# Patient Record
Sex: FEMALE | Race: OTHER | NOT HISPANIC OR LATINO
[De-identification: names, ages, dates, MRNs, and addresses within clinical notes are randomized per-mention and may not be internally consistent; named-entity substitution may affect disease eponyms.]

---

## 2019-06-24 ENCOUNTER — RESULT REVIEW (OUTPATIENT)
Age: 34
End: 2019-06-24

## 2019-08-19 ENCOUNTER — EMERGENCY (EMERGENCY)
Facility: HOSPITAL | Age: 34
LOS: 1 days | Discharge: HOME | End: 2019-08-19
Attending: EMERGENCY MEDICINE
Payer: COMMERCIAL

## 2019-08-19 VITALS
WEIGHT: 138.01 LBS | HEIGHT: 68 IN | RESPIRATION RATE: 18 BRPM | TEMPERATURE: 98 F | DIASTOLIC BLOOD PRESSURE: 85 MMHG | SYSTOLIC BLOOD PRESSURE: 141 MMHG | HEART RATE: 69 BPM | OXYGEN SATURATION: 98 %

## 2019-08-19 LAB
ALBUMIN SERPL ELPH-MCNC: 4.2 G/DL — SIGNIFICANT CHANGE UP (ref 3.3–5)
ALP SERPL-CCNC: 42 U/L — SIGNIFICANT CHANGE UP (ref 40–120)
ALT FLD-CCNC: 10 U/L — SIGNIFICANT CHANGE UP (ref 10–45)
ANION GAP SERPL CALC-SCNC: 10 MMOL/L — SIGNIFICANT CHANGE UP (ref 5–17)
APTT BLD: 26.9 SEC — LOW (ref 27.5–36.3)
AST SERPL-CCNC: 13 U/L — SIGNIFICANT CHANGE UP (ref 10–40)
BASOPHILS # BLD AUTO: 0 K/UL — SIGNIFICANT CHANGE UP (ref 0–0.2)
BASOPHILS NFR BLD AUTO: 0.3 % — SIGNIFICANT CHANGE UP (ref 0–2)
BILIRUB SERPL-MCNC: 0.2 MG/DL — SIGNIFICANT CHANGE UP (ref 0.2–1.2)
BUN SERPL-MCNC: 6 MG/DL — LOW (ref 7–23)
CALCIUM SERPL-MCNC: 9.5 MG/DL — SIGNIFICANT CHANGE UP (ref 8.4–10.5)
CHLORIDE SERPL-SCNC: 101 MMOL/L — SIGNIFICANT CHANGE UP (ref 96–108)
CO2 SERPL-SCNC: 24 MMOL/L — SIGNIFICANT CHANGE UP (ref 22–31)
CREAT SERPL-MCNC: 0.63 MG/DL — SIGNIFICANT CHANGE UP (ref 0.5–1.3)
EOSINOPHIL # BLD AUTO: 0.2 K/UL — SIGNIFICANT CHANGE UP (ref 0–0.5)
EOSINOPHIL NFR BLD AUTO: 2 % — SIGNIFICANT CHANGE UP (ref 0–6)
GLUCOSE SERPL-MCNC: 99 MG/DL — SIGNIFICANT CHANGE UP (ref 70–99)
HCT VFR BLD CALC: 39.3 % — SIGNIFICANT CHANGE UP (ref 34.5–45)
HGB BLD-MCNC: 12.5 G/DL — SIGNIFICANT CHANGE UP (ref 11.5–15.5)
INR BLD: 0.98 RATIO — SIGNIFICANT CHANGE UP (ref 0.88–1.16)
LYMPHOCYTES # BLD AUTO: 2.3 K/UL — SIGNIFICANT CHANGE UP (ref 1–3.3)
LYMPHOCYTES # BLD AUTO: 20.4 % — SIGNIFICANT CHANGE UP (ref 13–44)
MCHC RBC-ENTMCNC: 28.4 PG — SIGNIFICANT CHANGE UP (ref 27–34)
MCHC RBC-ENTMCNC: 31.8 GM/DL — LOW (ref 32–36)
MCV RBC AUTO: 89.4 FL — SIGNIFICANT CHANGE UP (ref 80–100)
MONOCYTES # BLD AUTO: 0.7 K/UL — SIGNIFICANT CHANGE UP (ref 0–0.9)
MONOCYTES NFR BLD AUTO: 6.4 % — SIGNIFICANT CHANGE UP (ref 2–14)
NEUTROPHILS # BLD AUTO: 8.1 K/UL — HIGH (ref 1.8–7.4)
NEUTROPHILS NFR BLD AUTO: 70.9 % — SIGNIFICANT CHANGE UP (ref 43–77)
PLATELET # BLD AUTO: 247 K/UL — SIGNIFICANT CHANGE UP (ref 150–400)
POTASSIUM SERPL-MCNC: 3.6 MMOL/L — SIGNIFICANT CHANGE UP (ref 3.5–5.3)
POTASSIUM SERPL-SCNC: 3.6 MMOL/L — SIGNIFICANT CHANGE UP (ref 3.5–5.3)
PROT SERPL-MCNC: 7.2 G/DL — SIGNIFICANT CHANGE UP (ref 6–8.3)
PROTHROM AB SERPL-ACNC: 11.2 SEC — SIGNIFICANT CHANGE UP (ref 10–12.9)
RBC # BLD: 4.4 M/UL — SIGNIFICANT CHANGE UP (ref 3.8–5.2)
RBC # FLD: 12 % — SIGNIFICANT CHANGE UP (ref 10.3–14.5)
SODIUM SERPL-SCNC: 135 MMOL/L — SIGNIFICANT CHANGE UP (ref 135–145)
WBC # BLD: 11.4 K/UL — HIGH (ref 3.8–10.5)
WBC # FLD AUTO: 11.4 K/UL — HIGH (ref 3.8–10.5)

## 2019-08-19 PROCEDURE — 99284 EMERGENCY DEPT VISIT MOD MDM: CPT

## 2019-08-19 NOTE — ED PROVIDER NOTE - NSFOLLOWUPINSTRUCTIONS_ED_ALL_ED_FT
Rest, drink plenty of fluids.  Advance activity as tolerated.  Continue all previously prescribed medications as directed.  Follow up with your primary care physician in 48-72 hours- bring copies of your results.  Return to the ER for worsening or persistent symptoms, and/or ANY NEW OR CONCERNING SYMPTOMS. If you have issues obtaining follow up, please call: [phone] to obtain a doctor or specialist who takes your insurance in your area.    follow up with your gyn within one week  see attached for information on vaginal bleeding/return precautions

## 2019-08-19 NOTE — ED PROVIDER NOTE - PROGRESS NOTE DETAILS
Gavi PGY3: appreciate gyn c/s, us with fhr, can dc with gyn outpatient fu and return precautions. patient comfortable appearing, no abdominal pain.

## 2019-08-19 NOTE — ED PROVIDER NOTE - CLINICAL SUMMARY MEDICAL DECISION MAKING FREE TEXT BOX
35 yo F presenting with spotting, , no active bleeding from cervix, , will obtain TVUS, type and screen, h/h, gyn cs likely dc

## 2019-08-19 NOTE — ED ADULT NURSE NOTE - NSIMPLEMENTINTERV_GEN_ALL_ED
Implemented All Fall Risk Interventions:  [place] to call system. Call bell, personal items and telephone within reach. Instruct patient to call for assistance. Room bathroom lighting operational. Non-slip footwear when patient is off stretcher. Physically safe environment: no spills, clutter or unnecessary equipment. Stretcher in lowest position, wheels locked, appropriate side rails in place. Provide visual cue, wrist band, yellow gown, etc. Monitor gait and stability. Monitor for mental status changes and reorient to person, place, and time. Review medications for side effects contributing to fall risk. Reinforce activity limits and safety measures with patient and family.

## 2019-08-19 NOTE — ED PROVIDER NOTE - SHIFT CHANGE DETAILS
I have received sign out on this patient, briefly: 35y/o F  14 wks pregnant perp patient, presenting with vaginal bleeding with clots today; no pain; similar episode few weeks ago and diagnosed with "polyp" no intervention other than Rhogam at that time (approx 2 weeks ago); hemodynamically stable, currently awaiting labs (cbc back, no significant anemia), TV US and obgyn consultation.

## 2019-08-19 NOTE — ED PROVIDER NOTE - PHYSICAL EXAMINATION
VITALS: reviewed  GEN: NAD, A & O x 4  HEAD/EYES: NCAT, EOMI, anicteric sclerae  ENT: mucus membranes moist, oropharynx WNL, trachea midline  RESP: lungs CTA with equal breath sounds bilaterally, chest wall nontender and atraumatic  CV: heart with reg rhythm S1, S2, distal pulses intact and symmetric bilaterally  ABDOMEN: normoactive bowel sounds, soft, nondistended, nontender, no palpable masses  : no CVAT, external genitalia normal, no pooling of blood in vaginal vault, no active bleeding from cervix, brown discharge noted on speculum (Vera Castorena  tech chaperone)  MSK: extremities atraumatic and nontender, no edema, no asymmetry  SKIN: warm, dry, no rash, no bruising, no cyanosis. color appropriate for ethnicity  NEURO: alert, mentating appropriately, no facial asymmetry.  PSYCH: Affect appropriate    FHR on

## 2019-08-19 NOTE — ED PROVIDER NOTE - OBJECTIVE STATEMENT
33 yo F  14wga, presenting with spotting that started today. No associated abdominal pan. No fevers/chills. No dysuria/hematuria. No cp/sob. No trauma. Endorses one episode vaginal bleeding earlier in pregnancy from a polyp. 35 yo F  14wga, presenting with spotting that started today. Confirmed IUP. No associated abdominal pan. No fevers/chills. No dysuria/hematuria. No cp/sob. No trauma. Endorses one episode vaginal bleeding earlier in pregnancy from a polyp, received rhogam.

## 2019-08-19 NOTE — ED PROVIDER NOTE - ATTENDING CONTRIBUTION TO CARE
33yo female  at 14w p/w vaginal bleeding, painless today without clots. Similar bleeding several weeks ago 2/2 "polyp" received rhogam at that time. Denies new abd pain, chest pain, dyspnea, urinary symptoms, n/v/d. Pt upset about wait time and need for blood work. Discussed reasons for blood work at length, pt agreed. TVUS. Discuss with ob re: Rhogam.

## 2019-08-19 NOTE — ED ADULT NURSE NOTE - OBJECTIVE STATEMENT
Pt presents to ED with complaint of vaginal bleeding, AXOX3 with sig other and family at bedside, pt is 14 weeks pregnant, painless bleeding began at 3 pm today, pt denies trauma, no lightheadedness/dizzyness pt denies chest pain no shortness of breath, no n/v/d, no dysuria or hematuria, no fevers or chills.

## 2019-08-20 VITALS
HEART RATE: 71 BPM | DIASTOLIC BLOOD PRESSURE: 80 MMHG | SYSTOLIC BLOOD PRESSURE: 116 MMHG | OXYGEN SATURATION: 99 % | TEMPERATURE: 98 F | RESPIRATION RATE: 18 BRPM

## 2019-08-20 DIAGNOSIS — N84.1 POLYP OF CERVIX UTERI: ICD-10-CM

## 2019-08-20 LAB
ANTIBODY ID 1_1: SIGNIFICANT CHANGE UP
APPEARANCE UR: ABNORMAL
BACTERIA # UR AUTO: ABNORMAL
BILIRUB UR-MCNC: NEGATIVE — SIGNIFICANT CHANGE UP
BLD GP AB SCN SERPL QL: POSITIVE — SIGNIFICANT CHANGE UP
COLOR SPEC: YELLOW — SIGNIFICANT CHANGE UP
CULTURE RESULTS: SIGNIFICANT CHANGE UP
DIFF PNL FLD: ABNORMAL
EPI CELLS # UR: 7 /HPF — HIGH
GLUCOSE UR QL: NEGATIVE — SIGNIFICANT CHANGE UP
HYALINE CASTS # UR AUTO: 4 /LPF — HIGH (ref 0–2)
KETONES UR-MCNC: NEGATIVE — SIGNIFICANT CHANGE UP
LEUKOCYTE ESTERASE UR-ACNC: NEGATIVE — SIGNIFICANT CHANGE UP
NITRITE UR-MCNC: NEGATIVE — SIGNIFICANT CHANGE UP
PH UR: 6 — SIGNIFICANT CHANGE UP (ref 5–8)
PROT UR-MCNC: ABNORMAL
RBC CASTS # UR COMP ASSIST: 8 /HPF — HIGH (ref 0–4)
RH IG SCN BLD-IMP: NEGATIVE — SIGNIFICANT CHANGE UP
SP GR SPEC: 1.03 — HIGH (ref 1.01–1.02)
SPECIMEN SOURCE: SIGNIFICANT CHANGE UP
UROBILINOGEN FLD QL: NEGATIVE — SIGNIFICANT CHANGE UP
WBC UR QL: 1 /HPF — SIGNIFICANT CHANGE UP (ref 0–5)

## 2019-08-20 PROCEDURE — 76817 TRANSVAGINAL US OBSTETRIC: CPT | Mod: 26

## 2019-08-20 PROCEDURE — 99284 EMERGENCY DEPT VISIT MOD MDM: CPT | Mod: 25

## 2019-08-20 PROCEDURE — 86900 BLOOD TYPING SEROLOGIC ABO: CPT

## 2019-08-20 PROCEDURE — 87086 URINE CULTURE/COLONY COUNT: CPT

## 2019-08-20 PROCEDURE — 76817 TRANSVAGINAL US OBSTETRIC: CPT

## 2019-08-20 PROCEDURE — 80053 COMPREHEN METABOLIC PANEL: CPT

## 2019-08-20 PROCEDURE — 76815 OB US LIMITED FETUS(S): CPT | Mod: 26

## 2019-08-20 PROCEDURE — 86077 PHYS BLOOD BANK SERV XMATCH: CPT

## 2019-08-20 PROCEDURE — 81001 URINALYSIS AUTO W/SCOPE: CPT

## 2019-08-20 PROCEDURE — 86901 BLOOD TYPING SEROLOGIC RH(D): CPT

## 2019-08-20 PROCEDURE — 85027 COMPLETE CBC AUTOMATED: CPT

## 2019-08-20 PROCEDURE — 86870 RBC ANTIBODY IDENTIFICATION: CPT

## 2019-08-20 PROCEDURE — 76815 OB US LIMITED FETUS(S): CPT

## 2019-08-20 PROCEDURE — 85610 PROTHROMBIN TIME: CPT

## 2019-08-20 PROCEDURE — 85730 THROMBOPLASTIN TIME PARTIAL: CPT

## 2019-08-20 PROCEDURE — 86850 RBC ANTIBODY SCREEN: CPT

## 2019-08-20 NOTE — CONSULT NOTE ADULT - SUBJECTIVE AND OBJECTIVE BOX
HPI    34y   Last Menstrual Period mid May (patient unsure of exact date) at roughly 14w gestation presents with vaginal spotting x2 yesterday. Per patient went to the bathroom and noted bright red blood when wiping as well as a small coin sized spot of blood in her underwear. Proceeded to have 1 additional episode of blood when wiping, then resolved. Had similar episode 2 weeks prior, cervical polyp on exam in OB/GYN office. Denies any pain, cramping, N/V, SOB/CP, urinary sx, etc.    OB/GYN provider: Dr. Merrill    OB: Current  GYN: Cervical polyp  PM: Hyopthyroid  PS: Denies  Rx: PNV, Synthroid 25mcg  All: NKDA  Psych: Denies  Social: Denies etOH, tobacco, illicit drug use    ROS Negative unless otherwise noted in HPI    Vital Signs Last 24 Hrs  T(C): 36.6 (19 Aug 2019 20:22), Max: 36.7 (19 Aug 2019 18:05)  T(F): 97.9 (19 Aug 2019 20:22), Max: 98.1 (19 Aug 2019 18:05)  HR: 66 (19 Aug 2019 20:22) (66 - 69)  BP: 134/80 (19 Aug 2019 20:22) (134/80 - 141/85)  BP(mean): --  RR: 18 (19 Aug 2019 20:22) (18 - 18)  SpO2: 100% (19 Aug 2019 20:22) (98% - 100%)    PHYSICAL EXAM:  Constitutional: alert and oriented x 3  Respiratory: clear  Cardiovascular: regular rate and rhythm  Gastrointestinal: soft, non tender  Genitourinary: NEFG, +cervical polyp, no blood in the vault, no CMT; 14-16w size uterus, NTTP; Adnexa NTTP bilaterally, no masses noted      LABS:                        12.5   11.4  )-----------( 247      ( 19 Aug 2019 23:12 )             39.3     -    135  |  101  |  6<L>  ----------------------------<  99  3.6   |  24  |  0.63    Ca    9.5      19 Aug 2019 23:12    TPro  7.2  /  Alb  4.2  /  TBili  0.2  /  DBili  x   /  AST  13  /  ALT  10  /  AlkPhos  42  08-19    PT/INR - ( 19 Aug 2019 23:12 )   PT: 11.2 sec;   INR: 0.98 ratio         PTT - ( 19 Aug 2019 23:12 )  PTT:26.9 sec  Urinalysis Basic - ( 19 Aug 2019 23:43 )    Color: Yellow / Appearance: Slightly Turbid / S.026 / pH: x  Gluc: x / Ketone: Negative  / Bili: Negative / Urobili: Negative   Blood: x / Protein: Trace / Nitrite: Negative   Leuk Esterase: Negative / RBC: 8 /hpf / WBC 1 /HPF   Sq Epi: x / Non Sq Epi: 7 /hpf / Bacteria: Few        Blood Type: A Negative      RADIOLOGY & ADDITIONAL STUDIES:        TVUS wnl per radiology (read pending), +FHR

## 2019-08-20 NOTE — CONSULT NOTE ADULT - ASSESSMENT
34y   Last Menstrual Period mid May (patient unsure of exact date) at roughly 14w gestation presents with vaginal spotting x2 yesterday with known cervical polyp. Cervical polyp visualized on exam and TVUS benign with +FHR. Minimal vaginal bleeding likely secondary to cervical polyp. No further OB/GYN concerns at this time.

## 2019-08-20 NOTE — CONSULT NOTE ADULT - PROBLEM SELECTOR RECOMMENDATION 9
Discharge home with bleeding precautions and routine outpatient OB/GYN follow up.    Ailin Cortes, PGY2  D/W Dr. Merrill

## 2019-08-23 PROBLEM — Z78.9 OTHER SPECIFIED HEALTH STATUS: Chronic | Status: ACTIVE | Noted: 2019-08-19

## 2019-08-23 PROBLEM — Z00.00 ENCOUNTER FOR PREVENTIVE HEALTH EXAMINATION: Status: ACTIVE | Noted: 2019-08-23

## 2019-08-26 ENCOUNTER — APPOINTMENT (OUTPATIENT)
Dept: ANTEPARTUM | Facility: CLINIC | Age: 34
End: 2019-08-26
Payer: COMMERCIAL

## 2019-08-26 ENCOUNTER — ASOB RESULT (OUTPATIENT)
Age: 34
End: 2019-08-26

## 2019-08-26 PROCEDURE — 76805 OB US >/= 14 WKS SNGL FETUS: CPT

## 2019-09-20 ENCOUNTER — EMERGENCY (EMERGENCY)
Facility: HOSPITAL | Age: 34
LOS: 1 days | Discharge: HOME | End: 2019-09-20
Attending: INTERNAL MEDICINE | Admitting: INTERNAL MEDICINE
Payer: COMMERCIAL

## 2019-09-20 VITALS
RESPIRATION RATE: 16 BRPM | WEIGHT: 147.05 LBS | OXYGEN SATURATION: 97 % | HEART RATE: 64 BPM | TEMPERATURE: 97 F | SYSTOLIC BLOOD PRESSURE: 145 MMHG | DIASTOLIC BLOOD PRESSURE: 76 MMHG | HEIGHT: 68 IN

## 2019-09-20 LAB
ALBUMIN SERPL ELPH-MCNC: 3.2 G/DL — LOW (ref 3.3–5)
ALP SERPL-CCNC: 53 U/L — SIGNIFICANT CHANGE UP (ref 40–120)
ALT FLD-CCNC: 21 U/L — SIGNIFICANT CHANGE UP (ref 10–45)
ANION GAP SERPL CALC-SCNC: 8 MMOL/L — SIGNIFICANT CHANGE UP (ref 5–17)
APPEARANCE UR: CLEAR — SIGNIFICANT CHANGE UP
AST SERPL-CCNC: 20 U/L — SIGNIFICANT CHANGE UP (ref 10–40)
BACTERIA # UR AUTO: NEGATIVE /HPF — SIGNIFICANT CHANGE UP
BASOPHILS # BLD AUTO: 0.26 K/UL — HIGH (ref 0–0.2)
BASOPHILS NFR BLD AUTO: 2 % — SIGNIFICANT CHANGE UP (ref 0–2)
BILIRUB SERPL-MCNC: 0.3 MG/DL — SIGNIFICANT CHANGE UP (ref 0.2–1.2)
BILIRUB UR-MCNC: NEGATIVE — SIGNIFICANT CHANGE UP
BLD GP AB SCN SERPL QL: SIGNIFICANT CHANGE UP
BUN SERPL-MCNC: 8 MG/DL — SIGNIFICANT CHANGE UP (ref 7–23)
CALCIUM SERPL-MCNC: 8.7 MG/DL — SIGNIFICANT CHANGE UP (ref 8.4–10.5)
CHLORIDE SERPL-SCNC: 104 MMOL/L — SIGNIFICANT CHANGE UP (ref 96–108)
CO2 SERPL-SCNC: 26 MMOL/L — SIGNIFICANT CHANGE UP (ref 22–31)
COLOR SPEC: YELLOW — SIGNIFICANT CHANGE UP
CREAT SERPL-MCNC: 0.62 MG/DL — SIGNIFICANT CHANGE UP (ref 0.5–1.3)
DIFF PNL FLD: ABNORMAL
DIR ANTIGLOB POLYSPECIFIC INTERPRETATION: SIGNIFICANT CHANGE UP
EOSINOPHIL # BLD AUTO: 0.13 K/UL — SIGNIFICANT CHANGE UP (ref 0–0.5)
EOSINOPHIL NFR BLD AUTO: 1 % — SIGNIFICANT CHANGE UP (ref 0–6)
EPI CELLS # UR: SIGNIFICANT CHANGE UP
GLUCOSE SERPL-MCNC: 91 MG/DL — SIGNIFICANT CHANGE UP (ref 70–99)
GLUCOSE UR QL: NEGATIVE — SIGNIFICANT CHANGE UP
HCG SERPL-ACNC: HIGH MIU/ML
HCT VFR BLD CALC: 34.5 % — SIGNIFICANT CHANGE UP (ref 34.5–45)
HGB BLD-MCNC: 11.3 G/DL — LOW (ref 11.5–15.5)
KETONES UR-MCNC: NEGATIVE — SIGNIFICANT CHANGE UP
LEUKOCYTE ESTERASE UR-ACNC: NEGATIVE — SIGNIFICANT CHANGE UP
LYMPHOCYTES # BLD AUTO: 16 % — SIGNIFICANT CHANGE UP (ref 13–44)
LYMPHOCYTES # BLD AUTO: 2.07 K/UL — SIGNIFICANT CHANGE UP (ref 1–3.3)
MANUAL SMEAR VERIFICATION: SIGNIFICANT CHANGE UP
MCHC RBC-ENTMCNC: 30.1 PG — SIGNIFICANT CHANGE UP (ref 27–34)
MCHC RBC-ENTMCNC: 32.8 GM/DL — SIGNIFICANT CHANGE UP (ref 32–36)
MCV RBC AUTO: 92 FL — SIGNIFICANT CHANGE UP (ref 80–100)
MONOCYTES # BLD AUTO: 1.68 K/UL — HIGH (ref 0–0.9)
MONOCYTES NFR BLD AUTO: 13 % — SIGNIFICANT CHANGE UP (ref 2–14)
NEUTROPHILS # BLD AUTO: 8.42 K/UL — HIGH (ref 1.8–7.4)
NEUTROPHILS NFR BLD AUTO: 63 % — SIGNIFICANT CHANGE UP (ref 43–77)
NEUTS BAND # BLD: 2 % — SIGNIFICANT CHANGE UP (ref 0–8)
NITRITE UR-MCNC: NEGATIVE — SIGNIFICANT CHANGE UP
NRBC # BLD: 0 /100 — SIGNIFICANT CHANGE UP (ref 0–0)
PH UR: 6.5 — SIGNIFICANT CHANGE UP (ref 5–8)
PLAT MORPH BLD: NORMAL — SIGNIFICANT CHANGE UP
PLATELET # BLD AUTO: 218 K/UL — SIGNIFICANT CHANGE UP (ref 150–400)
POTASSIUM SERPL-MCNC: 3.5 MMOL/L — SIGNIFICANT CHANGE UP (ref 3.5–5.3)
POTASSIUM SERPL-SCNC: 3.5 MMOL/L — SIGNIFICANT CHANGE UP (ref 3.5–5.3)
PROT SERPL-MCNC: 7.2 G/DL — SIGNIFICANT CHANGE UP (ref 6–8.3)
PROT UR-MCNC: NEGATIVE — SIGNIFICANT CHANGE UP
RBC # BLD: 3.75 M/UL — LOW (ref 3.8–5.2)
RBC # FLD: 13.1 % — SIGNIFICANT CHANGE UP (ref 10.3–14.5)
RBC BLD AUTO: NORMAL — SIGNIFICANT CHANGE UP
RBC CASTS # UR COMP ASSIST: SIGNIFICANT CHANGE UP /HPF (ref 0–4)
SODIUM SERPL-SCNC: 138 MMOL/L — SIGNIFICANT CHANGE UP (ref 135–145)
SP GR SPEC: 1 — LOW (ref 1.01–1.02)
UROBILINOGEN FLD QL: NEGATIVE — SIGNIFICANT CHANGE UP
VARIANT LYMPHS # BLD: 3 % — SIGNIFICANT CHANGE UP (ref 0–6)
WBC # BLD: 12.96 K/UL — HIGH (ref 3.8–10.5)
WBC # FLD AUTO: 12.96 K/UL — HIGH (ref 3.8–10.5)
WBC UR QL: NEGATIVE /HPF — SIGNIFICANT CHANGE UP (ref 0–5)

## 2019-09-20 PROCEDURE — 99284 EMERGENCY DEPT VISIT MOD MDM: CPT

## 2019-09-20 PROCEDURE — 76815 OB US LIMITED FETUS(S): CPT | Mod: 26

## 2019-09-20 NOTE — ED PROVIDER NOTE - NSFOLLOWUPINSTRUCTIONS_ED_ALL_ED_FT
pvt obgyn   Rest, drink plenty of fluids  Advance activity as tolerated  Continue all previously prescribed medications as directed  Follow up with your PMD 2-3 days- bring copies of your results  Return to the ER for worsening  Vaginal Bleeding During Pregnancy, Second Trimester  Image   A small amount of bleeding (spotting) from the vagina is common during pregnancy. Sometimes the bleeding is normal and is not a sign of problems, and sometimes it is a sign of something serious. Tell your doctor about any bleeding from your vagina right away.    Follow these instructions at home:  Activity     Follow your doctor's instructions about how active you can be.  If needed, make plans for someone to help with your normal activities.  Do not exercise or do activities that take a lot of effort until your doctor says that this is safe.  Do not lift anything that is heavier than 10 lb (4.5 kg) until your doctor says that this is safe.  Do not have sex or orgasms until your doctor says that this is safe.  Medicines     Take over-the-counter and prescription medicines only as told by your doctor.  Do not take aspirin. It can cause bleeding.  General instructions     Watch your condition for any changes.  Write down:  The number of pads you use each day.  How often you change pads.  How soaked (saturated) your pads are.  Do not use tampons.  Do not douche.  If you pass any tissue from your vagina, save it to show to your doctor.  Keep all follow-up visits as told by your doctor. This is important.  Contact a doctor if:  You have vaginal bleeding at any time during pregnancy.  You have cramps.  You have a fever that does not get better with medicine.  Get help right away if:  You have very bad cramps in your back or belly (abdomen).  You have contractions.  You have chills.  You pass large clots or a lot of tissue from your vagina.  Your bleeding gets worse.  You feel light-headed.  You feel weak.  You pass out (faint).  You are leaking fluid from your vagina.  You have a gush of fluid from your vagina.  Summary  Sometimes vaginal bleeding during pregnancy is normal and is not a problem. Sometimes it may be a sign of something serious.  Tell your doctor about any bleeding from your vagina right away.  Follow your doctor's instructions about how active you can be. You may need someone to help you with your normal activities.  This information is not intended to replace advice given to you by your health care provider. Make sure you discuss any questions you have with your health care provider.    Document Released: 05/04/2015 Document Revised: 03/21/2018 Document Reviewed: 03/21/2018

## 2019-09-20 NOTE — ED ADULT NURSE NOTE - OBJECTIVE STATEMENT
Pt presents to ER with chief complaint of Vaginal bleed that began today about 1900 after pt arrived home from work. Pt found moderate amount of blood which soled her underwear, denies passing any clots. Pt states she has "muscle pressure" to the suprapubic region. Pt has had vaginal bleeding during this pregnancy about a month ago.

## 2019-09-20 NOTE — ED PROVIDER NOTE - OBJECTIVE STATEMENT
this is a 32 yo fem, , RH-, 19 weeks preg, with known endometrial polyps, presenting today with 1 episode of mild dark red vaginal spotting while at work. 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 Martinez ([phone]). pt also explains she has lower abd pressure which is intermittent. no other complaints.

## 2019-09-20 NOTE — ED PROVIDER NOTE - NS ED ROS FT
Constitutional: - Fever, - Chills, - Anorexia, - Fatigue  GI: - Nausea, - Vomiting, - Abdominal Pain, + abd pressure  : - Dysuria, -Frequency, - Hematuria, - Hesitancy, - Incontinence  GYN: + vaginal bleeding  SKIN: - Color change, - Rash, - Swelling, - Bruises, - Wounds  NEURO: - Change in behavior, - Dec. Alertness, - Headache, - Dizziness

## 2019-09-20 NOTE — ED ADULT NURSE NOTE - NSIMPLEMENTINTERV_GEN_ALL_ED
Implemented All Universal Safety Interventions:  [place] to call system. Call bell, personal items and telephone within reach. Instruct patient to call for assistance. Room bathroom lighting operational. Non-slip footwear when patient is off stretcher. Physically safe environment: no spills, clutter or unnecessary equipment. Stretcher in lowest position, wheels locked, appropriate side rails in place.

## 2019-09-20 NOTE — ED PROVIDER NOTE - PATIENT PORTAL LINK FT
You can access the FollowMyHealth Patient Portal offered by [facility] by registering at the following website: [email or web address]. By joining [facility]’s FollowMyHealth portal, you will also be able to view your health information using other applications (apps) compatible with our system.

## 2019-09-20 NOTE — ED PROVIDER NOTE - ATTENDING CONTRIBUTION TO CARE
this is a 34 yo fem, , RH-, 19 weeks preg, with known endometrial polyps, presenting today with 1 episode of mild dark red vaginal spotting while at work. 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 Martinez ([phone]). pt also explains she has lower abd pressure which is intermittent. no other complaints.  pelvic sono -siup fhr 142 , pt rh neg  last rhogam 2 m ago recommended repeat as per dr sandra Boyce:  I have reviewed and discussed with the NP/ resident the case specifics, including the history, physical assessment, evaluation, conclusion, laboratory results, and medical plan. I agree with the contents, and conclusions. I have personally examined, and interviewed the patient.

## 2019-09-20 NOTE — ED PROVIDER NOTE - PHYSICAL EXAMINATION
PE:   GEN: Awake, alert, interactive, NAD, non-toxic appearing.   HEAD: Atraumatic  ABD: soft, supple, non-tender, no guarding. BS x 4, normoactive.   GYN (Chaperone: RN Palladino): External genitalia normal, vaginal wall without fissures or lacerations, cervix is closed, no blood in the vault  NEURO: AOx3, CN II-XII grossly intact without focal deficit.   MSK: Moving all extremities with no apparent deformities.   SKIN: Warm, dry, normal color, without apparent rashes.

## 2019-09-20 NOTE — ED PROVIDER NOTE - CLINICAL SUMMARY MEDICAL DECISION MAKING FREE TEXT BOX
this is a 32 yo fem, , RH-, 19 weeks preg, with known endometrial polyps, presenting today with 1 episode of mild dark red vaginal spotting while at work. 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 Martinez ([phone]). pt also explains she has lower abd pressure which is intermittent. no other complaints.  pelvic sono -siup fhr 142 , pt rh neg  last rhogam 2 m ago recommended repeat as per dr flores

## 2019-09-21 VITALS
RESPIRATION RATE: 15 BRPM | DIASTOLIC BLOOD PRESSURE: 89 MMHG | SYSTOLIC BLOOD PRESSURE: 125 MMHG | TEMPERATURE: 98 F | HEART RATE: 60 BPM | OXYGEN SATURATION: 98 %

## 2019-09-21 LAB — ALLERGY+IMMUNOLOGY DIAG STUDY NOTE: SIGNIFICANT CHANGE UP

## 2019-09-21 PROCEDURE — 86901 BLOOD TYPING SEROLOGIC RH(D): CPT

## 2019-09-21 PROCEDURE — 86880 COOMBS TEST DIRECT: CPT

## 2019-09-21 PROCEDURE — 85027 COMPLETE CBC AUTOMATED: CPT

## 2019-09-21 PROCEDURE — 81001 URINALYSIS AUTO W/SCOPE: CPT

## 2019-09-21 PROCEDURE — 86870 RBC ANTIBODY IDENTIFICATION: CPT

## 2019-09-21 PROCEDURE — 36415 COLL VENOUS BLD VENIPUNCTURE: CPT

## 2019-09-21 PROCEDURE — 86850 RBC ANTIBODY SCREEN: CPT

## 2019-09-21 PROCEDURE — 80053 COMPREHEN METABOLIC PANEL: CPT

## 2019-09-21 PROCEDURE — 86905 BLOOD TYPING RBC ANTIGENS: CPT

## 2019-09-21 PROCEDURE — 86900 BLOOD TYPING SEROLOGIC ABO: CPT

## 2019-09-21 PROCEDURE — 84702 CHORIONIC GONADOTROPIN TEST: CPT

## 2019-09-21 PROCEDURE — 81025 URINE PREGNANCY TEST: CPT

## 2019-09-21 PROCEDURE — 76815 OB US LIMITED FETUS(S): CPT

## 2019-09-21 PROCEDURE — 99284 EMERGENCY DEPT VISIT MOD MDM: CPT

## 2019-10-04 ENCOUNTER — APPOINTMENT (OUTPATIENT)
Dept: ANTEPARTUM | Facility: CLINIC | Age: 34
End: 2019-10-04
Payer: COMMERCIAL

## 2019-10-04 ENCOUNTER — ASOB RESULT (OUTPATIENT)
Age: 34
End: 2019-10-04

## 2019-10-04 PROCEDURE — 76811 OB US DETAILED SNGL FETUS: CPT

## 2020-02-16 ENCOUNTER — INPATIENT (INPATIENT)
Facility: HOSPITAL | Age: 35
LOS: 2 days | Discharge: HOME | End: 2020-02-19
Attending: OBSTETRICS & GYNECOLOGY | Admitting: OBSTETRICS & GYNECOLOGY
Payer: COMMERCIAL

## 2020-02-16 VITALS — HEIGHT: 68 IN | WEIGHT: 178.57 LBS

## 2020-02-16 DIAGNOSIS — O26.899 OTHER SPECIFIED PREGNANCY RELATED CONDITIONS, UNSPECIFIED TRIMESTER: ICD-10-CM

## 2020-02-16 DIAGNOSIS — Z3A.00 WEEKS OF GESTATION OF PREGNANCY NOT SPECIFIED: ICD-10-CM

## 2020-02-16 DIAGNOSIS — Z34.80 ENCOUNTER FOR SUPERVISION OF OTHER NORMAL PREGNANCY, UNSPECIFIED TRIMESTER: ICD-10-CM

## 2020-02-16 LAB
BASOPHILS # BLD AUTO: 0 K/UL — SIGNIFICANT CHANGE UP (ref 0–0.2)
BASOPHILS NFR BLD AUTO: 0 % — SIGNIFICANT CHANGE UP (ref 0–2)
BLD GP AB SCN SERPL QL: NEGATIVE — SIGNIFICANT CHANGE UP
EOSINOPHIL # BLD AUTO: 0.26 K/UL — SIGNIFICANT CHANGE UP (ref 0–0.5)
EOSINOPHIL NFR BLD AUTO: 1.7 % — SIGNIFICANT CHANGE UP (ref 0–6)
HCT VFR BLD CALC: 42 % — SIGNIFICANT CHANGE UP (ref 34.5–45)
HGB BLD-MCNC: 13.7 G/DL — SIGNIFICANT CHANGE UP (ref 11.5–15.5)
LYMPHOCYTES # BLD AUTO: 1.92 K/UL — SIGNIFICANT CHANGE UP (ref 1–3.3)
LYMPHOCYTES # BLD AUTO: 12.6 % — LOW (ref 13–44)
MANUAL SMEAR VERIFICATION: SIGNIFICANT CHANGE UP
MCHC RBC-ENTMCNC: 29.8 PG — SIGNIFICANT CHANGE UP (ref 27–34)
MCHC RBC-ENTMCNC: 32.6 GM/DL — SIGNIFICANT CHANGE UP (ref 32–36)
MCV RBC AUTO: 91.3 FL — SIGNIFICANT CHANGE UP (ref 80–100)
METAMYELOCYTES # FLD: 5.1 % — HIGH (ref 0–0)
MONOCYTES # BLD AUTO: 1.02 K/UL — HIGH (ref 0–0.9)
MONOCYTES NFR BLD AUTO: 6.7 % — SIGNIFICANT CHANGE UP (ref 2–14)
MYELOCYTES NFR BLD: 1.7 % — HIGH (ref 0–0)
NEUTROPHILS # BLD AUTO: 10.64 K/UL — HIGH (ref 1.8–7.4)
NEUTROPHILS NFR BLD AUTO: 68.9 % — SIGNIFICANT CHANGE UP (ref 43–77)
NEUTS BAND # BLD: 0.8 % — SIGNIFICANT CHANGE UP (ref 0–8)
PLAT MORPH BLD: NORMAL — SIGNIFICANT CHANGE UP
PLATELET # BLD AUTO: 201 K/UL — SIGNIFICANT CHANGE UP (ref 150–400)
RBC # BLD: 4.6 M/UL — SIGNIFICANT CHANGE UP (ref 3.8–5.2)
RBC # FLD: 14.2 % — SIGNIFICANT CHANGE UP (ref 10.3–14.5)
RBC BLD AUTO: NORMAL — SIGNIFICANT CHANGE UP
RH IG SCN BLD-IMP: NEGATIVE — SIGNIFICANT CHANGE UP
VARIANT LYMPHS # BLD: 2.5 % — SIGNIFICANT CHANGE UP (ref 0–6)
WBC # BLD: 15.27 K/UL — HIGH (ref 3.8–10.5)
WBC # FLD AUTO: 15.27 K/UL — HIGH (ref 3.8–10.5)

## 2020-02-16 RX ORDER — CITRIC ACID/SODIUM CITRATE 300-500 MG
15 SOLUTION, ORAL ORAL EVERY 6 HOURS
Refills: 0 | Status: DISCONTINUED | OUTPATIENT
Start: 2020-02-16 | End: 2020-02-17

## 2020-02-16 RX ORDER — SODIUM CHLORIDE 9 MG/ML
1000 INJECTION, SOLUTION INTRAVENOUS
Refills: 0 | Status: DISCONTINUED | OUTPATIENT
Start: 2020-02-16 | End: 2020-02-17

## 2020-02-16 RX ORDER — OXYTOCIN 10 UNIT/ML
333.33 VIAL (ML) INJECTION
Qty: 20 | Refills: 0 | Status: DISCONTINUED | OUTPATIENT
Start: 2020-02-16 | End: 2020-02-19

## 2020-02-17 LAB — T PALLIDUM AB TITR SER: NEGATIVE — SIGNIFICANT CHANGE UP

## 2020-02-17 RX ORDER — HYDROCORTISONE 1 %
1 OINTMENT (GRAM) TOPICAL EVERY 6 HOURS
Refills: 0 | Status: DISCONTINUED | OUTPATIENT
Start: 2020-02-17 | End: 2020-02-19

## 2020-02-17 RX ORDER — IBUPROFEN 200 MG
600 TABLET ORAL EVERY 6 HOURS
Refills: 0 | Status: COMPLETED | OUTPATIENT
Start: 2020-02-17 | End: 2021-01-15

## 2020-02-17 RX ORDER — SODIUM CHLORIDE 9 MG/ML
3 INJECTION INTRAMUSCULAR; INTRAVENOUS; SUBCUTANEOUS EVERY 8 HOURS
Refills: 0 | Status: DISCONTINUED | OUTPATIENT
Start: 2020-02-17 | End: 2020-02-19

## 2020-02-17 RX ORDER — ACETAMINOPHEN 500 MG
975 TABLET ORAL
Refills: 0 | Status: DISCONTINUED | OUTPATIENT
Start: 2020-02-17 | End: 2020-02-19

## 2020-02-17 RX ORDER — OXYCODONE HYDROCHLORIDE 5 MG/1
5 TABLET ORAL
Refills: 0 | Status: DISCONTINUED | OUTPATIENT
Start: 2020-02-17 | End: 2020-02-19

## 2020-02-17 RX ORDER — OXYCODONE HYDROCHLORIDE 5 MG/1
5 TABLET ORAL ONCE
Refills: 0 | Status: DISCONTINUED | OUTPATIENT
Start: 2020-02-17 | End: 2020-02-19

## 2020-02-17 RX ORDER — DIPHENHYDRAMINE HCL 50 MG
25 CAPSULE ORAL EVERY 6 HOURS
Refills: 0 | Status: DISCONTINUED | OUTPATIENT
Start: 2020-02-17 | End: 2020-02-19

## 2020-02-17 RX ORDER — KETOROLAC TROMETHAMINE 30 MG/ML
30 SYRINGE (ML) INJECTION ONCE
Refills: 0 | Status: DISCONTINUED | OUTPATIENT
Start: 2020-02-17 | End: 2020-02-17

## 2020-02-17 RX ORDER — MAGNESIUM HYDROXIDE 400 MG/1
30 TABLET, CHEWABLE ORAL
Refills: 0 | Status: DISCONTINUED | OUTPATIENT
Start: 2020-02-17 | End: 2020-02-19

## 2020-02-17 RX ORDER — LANOLIN
1 OINTMENT (GRAM) TOPICAL EVERY 6 HOURS
Refills: 0 | Status: DISCONTINUED | OUTPATIENT
Start: 2020-02-17 | End: 2020-02-19

## 2020-02-17 RX ORDER — OXYTOCIN 10 UNIT/ML
2 VIAL (ML) INJECTION
Qty: 30 | Refills: 0 | Status: DISCONTINUED | OUTPATIENT
Start: 2020-02-17 | End: 2020-02-17

## 2020-02-17 RX ORDER — PRAMOXINE HYDROCHLORIDE 150 MG/15G
1 AEROSOL, FOAM RECTAL EVERY 4 HOURS
Refills: 0 | Status: DISCONTINUED | OUTPATIENT
Start: 2020-02-17 | End: 2020-02-19

## 2020-02-17 RX ORDER — DIBUCAINE 1 %
1 OINTMENT (GRAM) RECTAL EVERY 6 HOURS
Refills: 0 | Status: DISCONTINUED | OUTPATIENT
Start: 2020-02-17 | End: 2020-02-19

## 2020-02-17 RX ORDER — IBUPROFEN 200 MG
600 TABLET ORAL EVERY 6 HOURS
Refills: 0 | Status: DISCONTINUED | OUTPATIENT
Start: 2020-02-17 | End: 2020-02-19

## 2020-02-17 RX ORDER — SIMETHICONE 80 MG/1
80 TABLET, CHEWABLE ORAL EVERY 4 HOURS
Refills: 0 | Status: DISCONTINUED | OUTPATIENT
Start: 2020-02-17 | End: 2020-02-19

## 2020-02-17 RX ORDER — LEVOTHYROXINE SODIUM 125 MCG
25 TABLET ORAL DAILY
Refills: 0 | Status: DISCONTINUED | OUTPATIENT
Start: 2020-02-17 | End: 2020-02-19

## 2020-02-17 RX ORDER — TETANUS TOXOID, REDUCED DIPHTHERIA TOXOID AND ACELLULAR PERTUSSIS VACCINE, ADSORBED 5; 2.5; 8; 8; 2.5 [IU]/.5ML; [IU]/.5ML; UG/.5ML; UG/.5ML; UG/.5ML
0.5 SUSPENSION INTRAMUSCULAR ONCE
Refills: 0 | Status: DISCONTINUED | OUTPATIENT
Start: 2020-02-17 | End: 2020-02-19

## 2020-02-17 RX ORDER — AER TRAVELER 0.5 G/1
1 SOLUTION RECTAL; TOPICAL EVERY 4 HOURS
Refills: 0 | Status: DISCONTINUED | OUTPATIENT
Start: 2020-02-17 | End: 2020-02-19

## 2020-02-17 RX ORDER — BENZOCAINE 10 %
1 GEL (GRAM) MUCOUS MEMBRANE EVERY 6 HOURS
Refills: 0 | Status: DISCONTINUED | OUTPATIENT
Start: 2020-02-17 | End: 2020-02-19

## 2020-02-17 RX ORDER — OXYTOCIN 10 UNIT/ML
333.33 VIAL (ML) INJECTION
Qty: 20 | Refills: 0 | Status: DISCONTINUED | OUTPATIENT
Start: 2020-02-17 | End: 2020-02-19

## 2020-02-17 RX ORDER — GLYCERIN ADULT
1 SUPPOSITORY, RECTAL RECTAL AT BEDTIME
Refills: 0 | Status: DISCONTINUED | OUTPATIENT
Start: 2020-02-17 | End: 2020-02-19

## 2020-02-17 RX ADMIN — OXYCODONE HYDROCHLORIDE 5 MILLIGRAM(S): 5 TABLET ORAL at 21:30

## 2020-02-17 RX ADMIN — OXYCODONE HYDROCHLORIDE 5 MILLIGRAM(S): 5 TABLET ORAL at 17:30

## 2020-02-17 RX ADMIN — OXYCODONE HYDROCHLORIDE 5 MILLIGRAM(S): 5 TABLET ORAL at 16:33

## 2020-02-17 RX ADMIN — Medication 30 MILLIGRAM(S): at 09:30

## 2020-02-17 RX ADMIN — Medication 2 MILLIUNIT(S)/MIN: at 02:57

## 2020-02-17 RX ADMIN — Medication 600 MILLIGRAM(S): at 15:11

## 2020-02-17 RX ADMIN — Medication 975 MILLIGRAM(S): at 21:30

## 2020-02-17 RX ADMIN — Medication 600 MILLIGRAM(S): at 16:33

## 2020-02-17 RX ADMIN — Medication 600 MILLIGRAM(S): at 23:30

## 2020-02-17 RX ADMIN — OXYCODONE HYDROCHLORIDE 5 MILLIGRAM(S): 5 TABLET ORAL at 23:30

## 2020-02-17 RX ADMIN — Medication 975 MILLIGRAM(S): at 14:28

## 2020-02-17 RX ADMIN — Medication 1 TABLET(S): at 14:28

## 2020-02-17 RX ADMIN — Medication 975 MILLIGRAM(S): at 20:02

## 2020-02-17 RX ADMIN — OXYCODONE HYDROCHLORIDE 5 MILLIGRAM(S): 5 TABLET ORAL at 20:33

## 2020-02-17 RX ADMIN — Medication 975 MILLIGRAM(S): at 15:12

## 2020-02-17 RX ADMIN — Medication 1000 MILLIUNIT(S)/MIN: at 10:03

## 2020-02-17 NOTE — PROVIDER CONTACT NOTE (OTHER) - ASSESSMENT
Patient alert and oriented. Vital signs stable. Patient ambulated safely to bathroom with minimal assistance. Abdominal binder given and pain medication administered.

## 2020-02-17 NOTE — CHART NOTE - NSCHARTNOTEFT_GEN_A_CORE
Patient assessed at bedside for neck stiffness since 3 hours.  She reports stiffness started once she reached the postpartum floor.  Otherwise patient has no complaints.  She has been out of bed only to void in the bathroom.  She believes the pain is MSK in nature and says she is hydrating well and will try to get out of bed more to walk the hallway.  Patient told to make aware is pain changes, or she is lightheaded, dizzy, nausea, short of breath, feverish or has any new symptoms.      VS  T(C): 36.6 (02-17-20 @ 16:15)  HR: 63 (02-17-20 @ 16:15)  BP: 128/76 (02-17-20 @ 16:15)  RR: 18 (02-17-20 @ 16:15)  SpO2: 97% (02-17-20 @ 16:15)    Luis Mcgovern, PGY1 Patient assessed at bedside for neck stiffness since 3 hours.  She reports stiffness started once she reached the postpartum floor.  Otherwise patient has no complaints.  She has been out of bed only to void in the bathroom.  She believes the pain is MSK in nature and says she is hydrating well and will try to get out of bed more to walk the hallway.  Taking PO tylenol, motrin and oxycodone which helps with symptoms.  Patient told to make aware is pain changes, or she is lightheaded, dizzy, nausea, short of breath, feverish or has any new symptoms.      VS  T(C): 36.6 (02-17-20 @ 16:15)  HR: 63 (02-17-20 @ 16:15)  BP: 128/76 (02-17-20 @ 16:15)  RR: 18 (02-17-20 @ 16:15)  SpO2: 97% (02-17-20 @ 16:15)    Luis Mcgovern, PGY1 Patient assessed at bedside for neck stiffness since 3 hours.  She reports stiffness started once she reached the postpartum floor.  Otherwise patient has no complaints.  She has been out of bed only to void in the bathroom.  She believes the pain is MSK in nature and says she is hydrating well and will try to get out of bed more to walk the hallway.  Taking PO tylenol, motrin and oxycodone which helps with symptoms.  Patient told to make aware is pain changes, or she is lightheaded, dizzy, nausea, short of breath, feverish or has any new symptoms.  Providing heat pack for symptomatic relief.    VS  T(C): 36.6 (02-17-20 @ 16:15)  HR: 63 (02-17-20 @ 16:15)  BP: 128/76 (02-17-20 @ 16:15)  RR: 18 (02-17-20 @ 16:15)  SpO2: 97% (02-17-20 @ 16:15)    d/w Dr. Greta Mcgovern, PGY1

## 2020-02-18 LAB
HCT VFR BLD CALC: 32.9 % — LOW (ref 34.5–45)
HGB BLD-MCNC: 10.4 G/DL — LOW (ref 11.5–15.5)

## 2020-02-18 RX ADMIN — Medication 975 MILLIGRAM(S): at 09:07

## 2020-02-18 RX ADMIN — Medication 975 MILLIGRAM(S): at 04:10

## 2020-02-18 RX ADMIN — Medication 975 MILLIGRAM(S): at 22:50

## 2020-02-18 RX ADMIN — OXYCODONE HYDROCHLORIDE 5 MILLIGRAM(S): 5 TABLET ORAL at 16:40

## 2020-02-18 RX ADMIN — Medication 975 MILLIGRAM(S): at 15:46

## 2020-02-18 RX ADMIN — OXYCODONE HYDROCHLORIDE 5 MILLIGRAM(S): 5 TABLET ORAL at 04:10

## 2020-02-18 RX ADMIN — Medication 25 MICROGRAM(S): at 06:22

## 2020-02-18 RX ADMIN — OXYCODONE HYDROCHLORIDE 5 MILLIGRAM(S): 5 TABLET ORAL at 09:08

## 2020-02-18 RX ADMIN — Medication 1 TABLET(S): at 11:46

## 2020-02-18 RX ADMIN — OXYCODONE HYDROCHLORIDE 5 MILLIGRAM(S): 5 TABLET ORAL at 10:00

## 2020-02-18 RX ADMIN — OXYCODONE HYDROCHLORIDE 5 MILLIGRAM(S): 5 TABLET ORAL at 00:30

## 2020-02-18 RX ADMIN — OXYCODONE HYDROCHLORIDE 5 MILLIGRAM(S): 5 TABLET ORAL at 06:50

## 2020-02-18 RX ADMIN — Medication 600 MILLIGRAM(S): at 11:46

## 2020-02-18 RX ADMIN — Medication 975 MILLIGRAM(S): at 03:11

## 2020-02-18 RX ADMIN — OXYCODONE HYDROCHLORIDE 5 MILLIGRAM(S): 5 TABLET ORAL at 06:21

## 2020-02-18 RX ADMIN — Medication 600 MILLIGRAM(S): at 06:21

## 2020-02-18 RX ADMIN — Medication 975 MILLIGRAM(S): at 21:56

## 2020-02-18 RX ADMIN — Medication 600 MILLIGRAM(S): at 00:30

## 2020-02-18 RX ADMIN — Medication 600 MILLIGRAM(S): at 12:40

## 2020-02-18 RX ADMIN — Medication 975 MILLIGRAM(S): at 16:40

## 2020-02-18 RX ADMIN — OXYCODONE HYDROCHLORIDE 5 MILLIGRAM(S): 5 TABLET ORAL at 03:12

## 2020-02-18 RX ADMIN — Medication 600 MILLIGRAM(S): at 06:50

## 2020-02-18 RX ADMIN — OXYCODONE HYDROCHLORIDE 5 MILLIGRAM(S): 5 TABLET ORAL at 15:46

## 2020-02-18 RX ADMIN — Medication 975 MILLIGRAM(S): at 10:00

## 2020-02-18 NOTE — PROGRESS NOTE ADULT - PROBLEM SELECTOR PLAN 1
34y PPD#1 s/p  doing well.    Plan:  Analgesia prn  Regular diet  Follow up am labs  Routine post partum care

## 2020-02-18 NOTE — PROGRESS NOTE ADULT - SUBJECTIVE AND OBJECTIVE BOX
PPD#1- ATTENDING NOTE    S: Patient doing well. Minimal lochia. Pain controlled.    O: Vital Signs Last 24 Hrs  T(C): 36.4 (18 Feb 2020 06:10), Max: 36.7 (17 Feb 2020 12:33)  T(F): 97.6 (18 Feb 2020 06:10), Max: 98.1 (17 Feb 2020 12:33)  HR: 61 (18 Feb 2020 06:10) (61 - 71)  BP: 107/70 (18 Feb 2020 06:10) (107/70 - 128/76)  BP(mean): --  RR: 18 (18 Feb 2020 06:10) (18 - 18)  SpO2: 98% (18 Feb 2020 06:10) (97% - 98%)    Gen: NAD  Abd: soft, NT, ND, fundus firm below umbilicus -2  Lochia: moderate  Ext: no tenderness, no hyper reflexia,     Labs:                        10.4   x     )-----------( x        ( 18 Feb 2020 06:32 )             32.9       A:         Office [phone]  Dr. Caldera

## 2020-02-18 NOTE — PROGRESS NOTE ADULT - ASSESSMENT
ANESTHESIA POSTOP CHECK    34y Female POSTOP DAY 1    Vital Signs Last 24 Hrs  T(C): 36.4 (18 Feb 2020 06:10), Max: 36.8 (17 Feb 2020 10:45)  T(F): 97.6 (18 Feb 2020 06:10), Max: 98.2 (17 Feb 2020 10:45)  HR: 61 (18 Feb 2020 06:10) (61 - 78)  BP: 107/70 (18 Feb 2020 06:10) (107/70 - 128/76)  BP(mean): 87 (17 Feb 2020 10:45) (85 - 87)  RR: 18 (18 Feb 2020 06:10) (17 - 19)  SpO2: 98% (18 Feb 2020 06:10) (97% - 98%)  I&O's Summary    17 Feb 2020 07:01  -  18 Feb 2020 07:00  --------------------------------------------------------  IN: 1000 mL / OUT: 1570 mL / NET: -570 mL        [X ] NO APPARENT ANESTHESIA COMPLICATIONS      Comments:

## 2020-02-18 NOTE — PROGRESS NOTE ADULT - ASSESSMENT
Postpartum Note- PPD#1    Allergies  No Known Allergies      Rubella: Immune  RPR: Nonreactive  Blood Type: A neg    S: Patient is a  34y    PPD#1 S/P .  Patient w/o complaints, pain is controlled.    Pt is OOB, tolerating PO, passing flatus. Lochia WNL.   Feeding: Breast and bottle    O:  Vital Signs Last 24 Hrs  T(C): 36.4 (2020 06:10), Max: 36.9 (2020 08:45)  T(F): 97.6 (2020 06:10), Max: 98.4 (2020 08:45)  HR: 61 (2020 06:10) (61 - 78)  BP: 107/70 (2020 06:10) (107/70 - 131/61)  BP(mean): 87 (2020 10:45) (85 - 87)  RR: 18 (2020 06:10) (17 - 19)  SpO2: 98% (2020 06:10) (97% - 99%)     Gen: NAD  Abdomen: Soft, nontender, non-distended, fundus firm.  Lochia: WNL  Perineum: 2nd degree  Ext: Neg edema, Neg calf tenderness.  Pedal pulses palpated B/L    LABS:    Hemoglobin: 13.7 g/dL (16 @ 22:27)      Hematocrit: 42.0 % (-16 @ 22:27)      A/P:  34y  PPD # 1 S/P , doing well.    PMHx: hypothyroidism, exercise induced asthma  Current Issues: None    Increase OOB  Regular diet  PO Pain protocol  AM H&H  Routine Postpartum Care    Niki Tan PA-C

## 2020-02-18 NOTE — PROGRESS NOTE ADULT - PROBLEM SELECTOR PLAN 1
34y  PPD # 1 S/P , doing well.    PMHx: hypothyroidism, exercise induced asthma  Current Issues: None

## 2020-02-19 ENCOUNTER — TRANSCRIPTION ENCOUNTER (OUTPATIENT)
Age: 35
End: 2020-02-19

## 2020-02-19 VITALS
SYSTOLIC BLOOD PRESSURE: 124 MMHG | OXYGEN SATURATION: 99 % | RESPIRATION RATE: 18 BRPM | DIASTOLIC BLOOD PRESSURE: 74 MMHG | HEART RATE: 71 BPM | TEMPERATURE: 98 F

## 2020-02-19 PROCEDURE — 59050 FETAL MONITOR W/REPORT: CPT

## 2020-02-19 PROCEDURE — 86901 BLOOD TYPING SEROLOGIC RH(D): CPT

## 2020-02-19 PROCEDURE — 85027 COMPLETE CBC AUTOMATED: CPT

## 2020-02-19 PROCEDURE — 86900 BLOOD TYPING SEROLOGIC ABO: CPT

## 2020-02-19 PROCEDURE — G0463: CPT

## 2020-02-19 PROCEDURE — 85014 HEMATOCRIT: CPT

## 2020-02-19 PROCEDURE — 86780 TREPONEMA PALLIDUM: CPT

## 2020-02-19 PROCEDURE — 59025 FETAL NON-STRESS TEST: CPT

## 2020-02-19 PROCEDURE — 86850 RBC ANTIBODY SCREEN: CPT

## 2020-02-19 PROCEDURE — 85018 HEMOGLOBIN: CPT

## 2020-02-19 RX ORDER — DIBUCAINE 1 %
1 OINTMENT (GRAM) RECTAL
Qty: 0 | Refills: 0 | DISCHARGE
Start: 2020-02-19

## 2020-02-19 RX ORDER — LEVOTHYROXINE SODIUM 125 MCG
0 TABLET ORAL
Qty: 0 | Refills: 0 | DISCHARGE

## 2020-02-19 RX ORDER — LEVOTHYROXINE SODIUM 125 MCG
1 TABLET ORAL
Qty: 0 | Refills: 0 | DISCHARGE
Start: 2020-02-19

## 2020-02-19 RX ORDER — ACETAMINOPHEN 500 MG
3 TABLET ORAL
Qty: 0 | Refills: 0 | DISCHARGE
Start: 2020-02-19

## 2020-02-19 RX ORDER — IBUPROFEN 200 MG
1 TABLET ORAL
Qty: 0 | Refills: 0 | DISCHARGE
Start: 2020-02-19

## 2020-02-19 RX ADMIN — Medication 975 MILLIGRAM(S): at 09:10

## 2020-02-19 RX ADMIN — Medication 600 MILLIGRAM(S): at 06:57

## 2020-02-19 RX ADMIN — Medication 25 MICROGRAM(S): at 06:11

## 2020-02-19 RX ADMIN — Medication 975 MILLIGRAM(S): at 08:15

## 2020-02-19 RX ADMIN — Medication 975 MILLIGRAM(S): at 04:00

## 2020-02-19 RX ADMIN — Medication 600 MILLIGRAM(S): at 06:11

## 2020-02-19 RX ADMIN — Medication 600 MILLIGRAM(S): at 00:55

## 2020-02-19 RX ADMIN — Medication 600 MILLIGRAM(S): at 01:41

## 2020-02-19 RX ADMIN — Medication 975 MILLIGRAM(S): at 03:00

## 2020-02-19 NOTE — PROGRESS NOTE ADULT - SUBJECTIVE AND OBJECTIVE BOX
PPD#2- ATTENDING NOTE    S: Patient doing well. Minimal lochia. Pain controlled.    O: Vital Signs Last 24 Hrs  T(C): 36.6 (2020 05:00), Max: 36.9 (2020 16:35)  T(F): 97.9 (2020 05:00), Max: 98.4 (2020 16:35)  HR: 71 (2020 05:00) (68 - 71)  BP: 124/74 (2020 05:00) (104/71 - 124/74)  BP(mean): 66 (2020 16:35) (66 - 68)  RR: 18 (2020 05:00) (18 - 18)  SpO2: 99% (2020 05:00) (99% - 99%)    Gen: NAD  Abd: soft, NT, ND, fundus firm below umbilicus  Lochia: moderate  Ext: no tenderness, no hyper reflexia  Voiding well    Labs:    ABO Interpretation: A (20 @ 22:13)  Rh Interpretation: Negative (20 @ 22:13)   Antibody ScreenNegative  baby rh neg-no rhogam needed                        10.4   x     )-----------( x        ( 2020 06:32 )             32.9       A: 34y PPD# s/p  doing well.    Plan:  Analgesia prn  Regular diet  Discharge instruction given  F/U 6 weeks      Office [phone]  Dr. Bishop

## 2020-02-19 NOTE — DISCHARGE NOTE OB - MEDICATION SUMMARY - MEDICATIONS TO TAKE
I will START or STAY ON the medications listed below when I get home from the hospital:    acetaminophen 325 mg oral tablet  -- 3 tab(s) by mouth   -- Indication: For mild to moderate pain    ibuprofen 600 mg oral tablet  -- 1 tab(s) by mouth every 6 hours  -- Indication: For mild to moderate pain    dibucaine 1% topical ointment  -- 1 application on skin every 6 hours, As needed, Perineal discomfort  -- Indication: For perineal discomfort    Prenatal Multivitamins with Folic Acid 1 mg oral tablet  -- 1 tab(s) by mouth once a day  -- Indication: For Supplement    levothyroxine 25 mcg (0.025 mg) oral tablet  -- 1 tab(s) by mouth once a day  -- Indication: For hypothyroidism

## 2020-02-19 NOTE — DISCHARGE NOTE OB - CARE PROVIDER_API CALL
Shade Bishop)  Obstetrics and Gynecology  [address] [place] [address]  Phone: [phone]  Fax: [phone]  Follow Up Time:

## 2020-02-19 NOTE — DISCHARGE NOTE OB - MEDICATION SUMMARY - MEDICATIONS TO STOP TAKING
Much improved. Patient with very mild case and will continue treatement with Nystatin swish and spit 4 times daily.      I will STOP taking the medications listed below when I get home from the hospital:  None

## 2020-02-19 NOTE — DISCHARGE NOTE OB - PATIENT PORTAL LINK FT
You can access the FollowMyHealth Patient Portal offered by [facility] by registering at the following website: [email or web address]. By joining [facility]’s FollowMyHealth portal, you will also be able to view your health information using other applications (apps) compatible with our system.

## 2021-07-15 ENCOUNTER — RESULT REVIEW (OUTPATIENT)
Age: 36
End: 2021-07-15

## 2022-02-02 ENCOUNTER — ASOB RESULT (OUTPATIENT)
Age: 37
End: 2022-02-02

## 2022-02-02 ENCOUNTER — APPOINTMENT (OUTPATIENT)
Dept: ANTEPARTUM | Facility: CLINIC | Age: 37
End: 2022-02-02
Payer: COMMERCIAL

## 2022-02-02 PROCEDURE — 76811 OB US DETAILED SNGL FETUS: CPT

## 2022-05-19 NOTE — ED ADULT NURSE NOTE - CAS TRG GENERAL AIRWAY, MLM
Patent Graft Cartilage Fenestration Text: The cartilage was fenestrated with a 3mm punch biopsy to help facilitate graft survival and healing.

## 2022-06-17 ENCOUNTER — OUTPATIENT (OUTPATIENT)
Dept: OUTPATIENT SERVICES | Facility: HOSPITAL | Age: 37
LOS: 1 days | End: 2022-06-17
Payer: COMMERCIAL

## 2022-06-17 DIAGNOSIS — Z11.52 ENCOUNTER FOR SCREENING FOR COVID-19: ICD-10-CM

## 2022-06-17 LAB — SARS-COV-2 RNA SPEC QL NAA+PROBE: SIGNIFICANT CHANGE UP

## 2022-06-17 PROCEDURE — U0003: CPT

## 2022-06-17 PROCEDURE — C9803: CPT

## 2022-06-17 PROCEDURE — U0005: CPT

## 2022-06-18 ENCOUNTER — INPATIENT (INPATIENT)
Facility: HOSPITAL | Age: 37
LOS: 1 days | Discharge: HOME | End: 2022-06-20
Attending: OBSTETRICS & GYNECOLOGY | Admitting: OBSTETRICS & GYNECOLOGY
Payer: COMMERCIAL

## 2022-06-18 VITALS — OXYGEN SATURATION: 97 % | HEART RATE: 68 BPM

## 2022-06-18 LAB
ALBUMIN SERPL ELPH-MCNC: 3.7 G/DL — SIGNIFICANT CHANGE UP (ref 3.3–5)
ALP SERPL-CCNC: 150 U/L — HIGH (ref 40–120)
ALT FLD-CCNC: 14 U/L — SIGNIFICANT CHANGE UP (ref 10–45)
ANION GAP SERPL CALC-SCNC: 16 MMOL/L — SIGNIFICANT CHANGE UP (ref 5–17)
APTT BLD: 24.8 SEC — LOW (ref 27.5–35.5)
AST SERPL-CCNC: 23 U/L — SIGNIFICANT CHANGE UP (ref 10–40)
BILIRUB SERPL-MCNC: 0.4 MG/DL — SIGNIFICANT CHANGE UP (ref 0.2–1.2)
BUN SERPL-MCNC: 9 MG/DL — SIGNIFICANT CHANGE UP (ref 7–23)
CALCIUM SERPL-MCNC: 9.3 MG/DL — SIGNIFICANT CHANGE UP (ref 8.4–10.5)
CHLORIDE SERPL-SCNC: 104 MMOL/L — SIGNIFICANT CHANGE UP (ref 96–108)
CO2 SERPL-SCNC: 17 MMOL/L — LOW (ref 22–31)
CREAT SERPL-MCNC: 0.66 MG/DL — SIGNIFICANT CHANGE UP (ref 0.5–1.3)
EGFR: 117 ML/MIN/1.73M2 — SIGNIFICANT CHANGE UP
FIBRINOGEN PPP-MCNC: 657 MG/DL — HIGH (ref 330–520)
GLUCOSE SERPL-MCNC: 87 MG/DL — SIGNIFICANT CHANGE UP (ref 70–99)
INR BLD: 0.95 RATIO — SIGNIFICANT CHANGE UP (ref 0.88–1.16)
LDH SERPL L TO P-CCNC: 265 U/L — HIGH (ref 50–242)
POTASSIUM SERPL-MCNC: 3.7 MMOL/L — SIGNIFICANT CHANGE UP (ref 3.5–5.3)
POTASSIUM SERPL-SCNC: 3.7 MMOL/L — SIGNIFICANT CHANGE UP (ref 3.5–5.3)
PROT SERPL-MCNC: 6.6 G/DL — SIGNIFICANT CHANGE UP (ref 6–8.3)
PROTHROM AB SERPL-ACNC: 10.9 SEC — SIGNIFICANT CHANGE UP (ref 10.5–13.4)
SODIUM SERPL-SCNC: 137 MMOL/L — SIGNIFICANT CHANGE UP (ref 135–145)
URATE SERPL-MCNC: 6.4 MG/DL — SIGNIFICANT CHANGE UP (ref 2.5–7)

## 2022-06-18 RX ORDER — AMPICILLIN TRIHYDRATE 250 MG
2 CAPSULE ORAL ONCE
Refills: 0 | Status: COMPLETED | OUTPATIENT
Start: 2022-06-18 | End: 2022-06-18

## 2022-06-18 RX ORDER — AMPICILLIN TRIHYDRATE 250 MG
1 CAPSULE ORAL EVERY 4 HOURS
Refills: 0 | Status: DISCONTINUED | OUTPATIENT
Start: 2022-06-18 | End: 2022-06-19

## 2022-06-18 RX ORDER — CITRIC ACID/SODIUM CITRATE 300-500 MG
15 SOLUTION, ORAL ORAL EVERY 6 HOURS
Refills: 0 | Status: DISCONTINUED | OUTPATIENT
Start: 2022-06-18 | End: 2022-06-19

## 2022-06-18 RX ORDER — OXYTOCIN 10 UNIT/ML
333.33 VIAL (ML) INJECTION
Qty: 20 | Refills: 0 | Status: DISCONTINUED | OUTPATIENT
Start: 2022-06-18 | End: 2022-06-19

## 2022-06-18 RX ORDER — SODIUM CHLORIDE 9 MG/ML
1000 INJECTION, SOLUTION INTRAVENOUS
Refills: 0 | Status: DISCONTINUED | OUTPATIENT
Start: 2022-06-18 | End: 2022-06-19

## 2022-06-18 RX ADMIN — Medication 15 MILLILITER(S): at 22:21

## 2022-06-18 RX ADMIN — Medication 200 GRAM(S): at 22:15

## 2022-06-18 RX ADMIN — SODIUM CHLORIDE 125 MILLILITER(S): 9 INJECTION, SOLUTION INTRAVENOUS at 21:45

## 2022-06-18 NOTE — OB PROVIDER H&P - ASSESSMENT
36yo  at 39+3 presenting for eIOL, BPs elevated on admission  - Admit to L&D  - EFM & [place]  - Epi PRN  - Routine labs and HELLP labs   - PO cytotec for induction  - Ampicillin for GBS positive    D/w Dr. Desirae Nagel, PGY-1

## 2022-06-18 NOTE — OB PROVIDER H&P - HISTORY OF PRESENT ILLNESS
38yo  at 39+3 presenting for eIOL. Having intermittent CTX. No VB/LOF. Good FM.     PNC: [facility]  GBS pos  [id number]    Denies fever, chills, nausea, vomiting, diarrhea, headache, constipation, dizziness, syncope, chest pain, palpitations, shortness of breath, dysuria, urgency, frequency.    ObHx: G1: 2020, FT   GynHx: denies  MedHx: denies  SrgHx: denies  PsychHx: denies  SocialHx: denies  AllergyHx: NKDA  RxHx: denies   38yo  at 39+3 presenting for eIOL. Having intermittent CTX. No VB/LOF. Good FM.     PNC: [facility]  GBS pos  [id number]    Denies fever, chills, nausea, vomiting, diarrhea, headache, constipation, dizziness, syncope, chest pain, palpitations, shortness of breath, dysuria, urgency, frequency.    ObHx: G1: , FT  7#15  GynHx: denies  MedHx: denies  SrgHx: denies  PsychHx: denies  SocialHx: denies  AllergyHx: NKDA  RxHx: denies

## 2022-06-18 NOTE — OB RN PATIENT PROFILE - FALL HARM RISK - UNIVERSAL INTERVENTIONS
Bed in lowest position, wheels locked, appropriate side rails in place/Call bell, personal items and telephone in reach/Instruct patient to call for assistance before getting out of bed or chair/Non-slip footwear when patient is out of bed/[place] to call system/Physically safe environment - no spills, clutter or unnecessary equipment/Purposeful Proactive Rounding/Room/bathroom lighting operational, light cord in reach

## 2022-06-19 ENCOUNTER — TRANSCRIPTION ENCOUNTER (OUTPATIENT)
Age: 37
End: 2022-06-19

## 2022-06-19 LAB
APPEARANCE UR: CLEAR — SIGNIFICANT CHANGE UP
BACTERIA # UR AUTO: NEGATIVE — SIGNIFICANT CHANGE UP
BASOPHILS # BLD AUTO: 0.1 K/UL — SIGNIFICANT CHANGE UP (ref 0–0.2)
BASOPHILS NFR BLD AUTO: 0.9 % — SIGNIFICANT CHANGE UP (ref 0–2)
BILIRUB UR-MCNC: NEGATIVE — SIGNIFICANT CHANGE UP
BLD GP AB SCN SERPL QL: POSITIVE — SIGNIFICANT CHANGE UP
COLOR SPEC: SIGNIFICANT CHANGE UP
COVID-19 SPIKE DOMAIN AB INTERP: POSITIVE
COVID-19 SPIKE DOMAIN ANTIBODY RESULT: >250 U/ML — HIGH
CREAT ?TM UR-MCNC: 72 MG/DL — SIGNIFICANT CHANGE UP
DIFF PNL FLD: ABNORMAL
EOSINOPHIL # BLD AUTO: 0.18 K/UL — SIGNIFICANT CHANGE UP (ref 0–0.5)
EOSINOPHIL NFR BLD AUTO: 1.7 % — SIGNIFICANT CHANGE UP (ref 0–6)
EPI CELLS # UR: 7 /HPF — HIGH
GLUCOSE UR QL: NEGATIVE — SIGNIFICANT CHANGE UP
HCT VFR BLD CALC: 39.5 % — SIGNIFICANT CHANGE UP (ref 34.5–45)
HGB BLD-MCNC: 13.2 G/DL — SIGNIFICANT CHANGE UP (ref 11.5–15.5)
HYALINE CASTS # UR AUTO: 1 /LPF — SIGNIFICANT CHANGE UP (ref 0–2)
KETONES UR-MCNC: SIGNIFICANT CHANGE UP
LEUKOCYTE ESTERASE UR-ACNC: ABNORMAL
LYMPHOCYTES # BLD AUTO: 1.3 K/UL — SIGNIFICANT CHANGE UP (ref 1–3.3)
LYMPHOCYTES # BLD AUTO: 12.2 % — LOW (ref 13–44)
MANUAL SMEAR VERIFICATION: SIGNIFICANT CHANGE UP
MCHC RBC-ENTMCNC: 30.5 PG — SIGNIFICANT CHANGE UP (ref 27–34)
MCHC RBC-ENTMCNC: 33.4 GM/DL — SIGNIFICANT CHANGE UP (ref 32–36)
MCV RBC AUTO: 91.2 FL — SIGNIFICANT CHANGE UP (ref 80–100)
MONOCYTES # BLD AUTO: 0.65 K/UL — SIGNIFICANT CHANGE UP (ref 0–0.9)
MONOCYTES NFR BLD AUTO: 6.1 % — SIGNIFICANT CHANGE UP (ref 2–14)
MYELOCYTES NFR BLD: 2.6 % — HIGH (ref 0–0)
NEUTROPHILS # BLD AUTO: 7.94 K/UL — HIGH (ref 1.8–7.4)
NEUTROPHILS NFR BLD AUTO: 74.8 % — SIGNIFICANT CHANGE UP (ref 43–77)
NITRITE UR-MCNC: NEGATIVE — SIGNIFICANT CHANGE UP
PH UR: 6.5 — SIGNIFICANT CHANGE UP (ref 5–8)
PLAT MORPH BLD: NORMAL — SIGNIFICANT CHANGE UP
PLATELET # BLD AUTO: 195 K/UL — SIGNIFICANT CHANGE UP (ref 150–400)
PROT ?TM UR-MCNC: 10 MG/DL — SIGNIFICANT CHANGE UP (ref 0–12)
PROT UR-MCNC: NEGATIVE — SIGNIFICANT CHANGE UP
PROT/CREAT UR-RTO: 0.1 RATIO — SIGNIFICANT CHANGE UP (ref 0–0.2)
RBC # BLD: 4.33 M/UL — SIGNIFICANT CHANGE UP (ref 3.8–5.2)
RBC # FLD: 14 % — SIGNIFICANT CHANGE UP (ref 10.3–14.5)
RBC BLD AUTO: NORMAL — SIGNIFICANT CHANGE UP
RBC CASTS # UR COMP ASSIST: 1 /HPF — SIGNIFICANT CHANGE UP (ref 0–4)
RH IG SCN BLD-IMP: NEGATIVE — SIGNIFICANT CHANGE UP
SARS-COV-2 IGG+IGM SERPL QL IA: >250 U/ML — HIGH
SARS-COV-2 IGG+IGM SERPL QL IA: POSITIVE
SP GR SPEC: 1.02 — SIGNIFICANT CHANGE UP (ref 1.01–1.02)
T PALLIDUM AB TITR SER: NEGATIVE — SIGNIFICANT CHANGE UP
UROBILINOGEN FLD QL: NEGATIVE — SIGNIFICANT CHANGE UP
VARIANT LYMPHS # BLD: 1.7 % — SIGNIFICANT CHANGE UP (ref 0–6)
WBC # BLD: 10.62 K/UL — HIGH (ref 3.8–10.5)
WBC # FLD AUTO: 10.62 K/UL — HIGH (ref 3.8–10.5)
WBC UR QL: 4 /HPF — SIGNIFICANT CHANGE UP (ref 0–5)

## 2022-06-19 PROCEDURE — 86077 PHYS BLOOD BANK SERV XMATCH: CPT

## 2022-06-19 RX ORDER — HYDROCORTISONE 1 %
1 OINTMENT (GRAM) TOPICAL EVERY 6 HOURS
Refills: 0 | Status: DISCONTINUED | OUTPATIENT
Start: 2022-06-19 | End: 2022-06-20

## 2022-06-19 RX ORDER — SODIUM CHLORIDE 9 MG/ML
3 INJECTION INTRAMUSCULAR; INTRAVENOUS; SUBCUTANEOUS EVERY 8 HOURS
Refills: 0 | Status: DISCONTINUED | OUTPATIENT
Start: 2022-06-19 | End: 2022-06-20

## 2022-06-19 RX ORDER — IBUPROFEN 200 MG
600 TABLET ORAL EVERY 6 HOURS
Refills: 0 | Status: COMPLETED | OUTPATIENT
Start: 2022-06-19 | End: 2023-05-18

## 2022-06-19 RX ORDER — OXYTOCIN 10 UNIT/ML
4 VIAL (ML) INJECTION
Qty: 30 | Refills: 0 | Status: DISCONTINUED | OUTPATIENT
Start: 2022-06-19 | End: 2022-06-19

## 2022-06-19 RX ORDER — DIBUCAINE 1 %
1 OINTMENT (GRAM) RECTAL EVERY 6 HOURS
Refills: 0 | Status: DISCONTINUED | OUTPATIENT
Start: 2022-06-19 | End: 2022-06-20

## 2022-06-19 RX ORDER — ACETAMINOPHEN 500 MG
975 TABLET ORAL
Refills: 0 | Status: DISCONTINUED | OUTPATIENT
Start: 2022-06-19 | End: 2022-06-20

## 2022-06-19 RX ORDER — OXYTOCIN 10 UNIT/ML
333.33 VIAL (ML) INJECTION
Qty: 20 | Refills: 0 | Status: DISCONTINUED | OUTPATIENT
Start: 2022-06-19 | End: 2022-06-20

## 2022-06-19 RX ORDER — LANOLIN
1 OINTMENT (GRAM) TOPICAL EVERY 6 HOURS
Refills: 0 | Status: DISCONTINUED | OUTPATIENT
Start: 2022-06-19 | End: 2022-06-20

## 2022-06-19 RX ORDER — IBUPROFEN 200 MG
600 TABLET ORAL EVERY 6 HOURS
Refills: 0 | Status: DISCONTINUED | OUTPATIENT
Start: 2022-06-19 | End: 2022-06-20

## 2022-06-19 RX ORDER — DIPHENHYDRAMINE HCL 50 MG
25 CAPSULE ORAL EVERY 6 HOURS
Refills: 0 | Status: DISCONTINUED | OUTPATIENT
Start: 2022-06-19 | End: 2022-06-20

## 2022-06-19 RX ORDER — SIMETHICONE 80 MG/1
80 TABLET, CHEWABLE ORAL EVERY 4 HOURS
Refills: 0 | Status: DISCONTINUED | OUTPATIENT
Start: 2022-06-19 | End: 2022-06-20

## 2022-06-19 RX ORDER — MAGNESIUM HYDROXIDE 400 MG/1
30 TABLET, CHEWABLE ORAL
Refills: 0 | Status: DISCONTINUED | OUTPATIENT
Start: 2022-06-19 | End: 2022-06-20

## 2022-06-19 RX ORDER — PRAMOXINE HYDROCHLORIDE 150 MG/15G
1 AEROSOL, FOAM RECTAL EVERY 4 HOURS
Refills: 0 | Status: DISCONTINUED | OUTPATIENT
Start: 2022-06-19 | End: 2022-06-20

## 2022-06-19 RX ORDER — TETANUS TOXOID, REDUCED DIPHTHERIA TOXOID AND ACELLULAR PERTUSSIS VACCINE, ADSORBED 5; 2.5; 8; 8; 2.5 [IU]/.5ML; [IU]/.5ML; UG/.5ML; UG/.5ML; UG/.5ML
0.5 SUSPENSION INTRAMUSCULAR ONCE
Refills: 0 | Status: DISCONTINUED | OUTPATIENT
Start: 2022-06-19 | End: 2022-06-20

## 2022-06-19 RX ORDER — BENZOCAINE 10 %
1 GEL (GRAM) MUCOUS MEMBRANE EVERY 6 HOURS
Refills: 0 | Status: DISCONTINUED | OUTPATIENT
Start: 2022-06-19 | End: 2022-06-20

## 2022-06-19 RX ORDER — AER TRAVELER 0.5 G/1
1 SOLUTION RECTAL; TOPICAL EVERY 4 HOURS
Refills: 0 | Status: DISCONTINUED | OUTPATIENT
Start: 2022-06-19 | End: 2022-06-20

## 2022-06-19 RX ORDER — OXYCODONE HYDROCHLORIDE 5 MG/1
5 TABLET ORAL ONCE
Refills: 0 | Status: DISCONTINUED | OUTPATIENT
Start: 2022-06-19 | End: 2022-06-20

## 2022-06-19 RX ORDER — OXYCODONE HYDROCHLORIDE 5 MG/1
5 TABLET ORAL
Refills: 0 | Status: DISCONTINUED | OUTPATIENT
Start: 2022-06-19 | End: 2022-06-20

## 2022-06-19 RX ORDER — KETOROLAC TROMETHAMINE 30 MG/ML
30 SYRINGE (ML) INJECTION ONCE
Refills: 0 | Status: DISCONTINUED | OUTPATIENT
Start: 2022-06-19 | End: 2022-06-19

## 2022-06-19 RX ORDER — SODIUM CHLORIDE 9 MG/ML
1000 INJECTION, SOLUTION INTRAVENOUS
Refills: 0 | Status: DISCONTINUED | OUTPATIENT
Start: 2022-06-19 | End: 2022-06-19

## 2022-06-19 RX ADMIN — Medication 4 MILLIUNIT(S)/MIN: at 06:36

## 2022-06-19 RX ADMIN — Medication 1000 MILLIUNIT(S)/MIN: at 09:47

## 2022-06-19 RX ADMIN — Medication 975 MILLIGRAM(S): at 15:30

## 2022-06-19 RX ADMIN — Medication 975 MILLIGRAM(S): at 15:00

## 2022-06-19 RX ADMIN — Medication 975 MILLIGRAM(S): at 21:25

## 2022-06-19 RX ADMIN — Medication 108 GRAM(S): at 02:15

## 2022-06-19 RX ADMIN — SODIUM CHLORIDE 125 MILLILITER(S): 9 INJECTION, SOLUTION INTRAVENOUS at 23:35

## 2022-06-19 RX ADMIN — Medication 1 TABLET(S): at 15:01

## 2022-06-19 RX ADMIN — Medication 975 MILLIGRAM(S): at 20:58

## 2022-06-19 RX ADMIN — Medication 600 MILLIGRAM(S): at 18:26

## 2022-06-19 RX ADMIN — Medication 600 MILLIGRAM(S): at 18:51

## 2022-06-19 RX ADMIN — Medication 30 MILLIGRAM(S): at 10:02

## 2022-06-19 RX ADMIN — Medication 108 GRAM(S): at 06:15

## 2022-06-19 NOTE — OB PROVIDER LABOR PROGRESS NOTE - NS_SUBJECTIVE/OBJECTIVE_OBGYN_ALL_OB_FT
Taking over from Dr. Merrill. Patient here for eIOL. Patient feeling some pressure.  Pitocin on 4. Thinks she is tricking.    GBS positive  EFW 8lbs

## 2022-06-19 NOTE — OB RN DELIVERY SUMMARY - NS_SEPSISRSKCALC_OBGYN_ALL_OB_FT
EOS calculated successfully. EOS Risk Factor: 0.5/1000 live births ([facility] national incidence); GA=39w4d; Temp=98.6; ROM=0.017; GBS='Positive'; Antibiotics='Broad spectrum antibiotics 2-3.9 hrs prior to birth'

## 2022-06-19 NOTE — DISCHARGE NOTE OB - MEDICATION SUMMARY - MEDICATIONS TO TAKE
I will START or STAY ON the medications listed below when I get home from the hospital:    ibuprofen 600 mg oral tablet  -- 1 tab(s) by mouth every 6 hours  -- Indication: For pain    acetaminophen 325 mg oral tablet  -- 3 tab(s) by mouth every 6 hours  -- Indication: For pain    Prenatal Multivitamins with Folic Acid 1 mg oral tablet  -- 1 tab(s) by mouth once a day  -- Indication: For Vitamin

## 2022-06-19 NOTE — OB PROVIDER DELIVERY SUMMARY - NSPROVIDERDELIVERYNOTE_OBGYN_ALL_OB_FT
fully dilated. bed broken. betadyne used. head, shoulders and body delivered without difficulty and handed to mother. delayed cord clamping. pitocin started for active management of third stage. placenta delivered intact. fundus firm. bimanual exam no retained tissue. sulci and cervix intact. 2nd degree repaired as above.     Deb Silva DO

## 2022-06-19 NOTE — DISCHARGE NOTE OB - NS MD DC FALL RISK RISK
For information on Fall & Injury Prevention, visit: [email or web address] OR  [email or web address] OR  https://www.cdc.gov/steadi/patient.html

## 2022-06-19 NOTE — DISCHARGE NOTE OB - CARE PLAN
1 Principal Discharge DX:	Vaginal delivery  Assessment and plan of treatment:	Please call if you have heavy vaginal bleeding, fever, pain uncontrolled with pain medicine

## 2022-06-19 NOTE — DISCHARGE NOTE OB - MATERIALS PROVIDED
Vaccinations/[facility]  Screening Program/  Immunization Record/Breastfeeding Log/Breastfeeding Mother’s Support Group Information/Guide to Postpartum Care/[facility] Hearing Screen Program/Back To Sleep Handout/Shaken Baby Prevention Handout/Breastfeeding Guide and Packet/Birth Certificate Instructions/Discharge Medication Information for Patients and Families Pocket Guide

## 2022-06-19 NOTE — DISCHARGE NOTE OB - HOSPITAL COURSE
Patient came for elective IOL. Delivery and postpartum course uncomplicated. Discharged home on ppd1

## 2022-06-19 NOTE — DISCHARGE NOTE OB - MEDICATION SUMMARY - MEDICATIONS TO STOP TAKING
I will STOP taking the medications listed below when I get home from the hospital:    levothyroxine 25 mcg (0.025 mg) oral tablet  -- 1 tab(s) by mouth once a day

## 2022-06-19 NOTE — OB PROVIDER LABOR PROGRESS NOTE - ASSESSMENT
P1 here for eIOL  - consents signed. risks reviewed for vaginal, operative and  section.   - Continue pitocin  - Will set up for delivery and try pushing  - Anticipate       Deb Hernaneds DO

## 2022-06-19 NOTE — DISCHARGE NOTE OB - CARE PROVIDER_API CALL
Deb Hernandes (DO)  [facility] [address], [address]  [place] [address]  Phone: [phone]  Fax: [phone]  Follow Up Time:

## 2022-06-19 NOTE — DISCHARGE NOTE OB - PATIENT PORTAL LINK FT
You can access the FollowMyHealth Patient Portal offered by [facility] by registering at the following website: [email or web address]. By joining [facility]’s FollowMyHealth portal, you will also be able to view your health information using other applications (apps) compatible with our system.

## 2022-06-20 VITALS
DIASTOLIC BLOOD PRESSURE: 70 MMHG | HEIGHT: 68 IN | SYSTOLIC BLOOD PRESSURE: 100 MMHG | RESPIRATION RATE: 18 BRPM | WEIGHT: 179.9 LBS | TEMPERATURE: 98 F | HEART RATE: 81 BPM | OXYGEN SATURATION: 98 %

## 2022-06-20 PROCEDURE — 84156 ASSAY OF PROTEIN URINE: CPT

## 2022-06-20 PROCEDURE — 86901 BLOOD TYPING SEROLOGIC RH(D): CPT

## 2022-06-20 PROCEDURE — 86769 SARS-COV-2 COVID-19 ANTIBODY: CPT

## 2022-06-20 PROCEDURE — 80053 COMPREHEN METABOLIC PANEL: CPT

## 2022-06-20 PROCEDURE — 85610 PROTHROMBIN TIME: CPT

## 2022-06-20 PROCEDURE — 86900 BLOOD TYPING SEROLOGIC ABO: CPT

## 2022-06-20 PROCEDURE — 86780 TREPONEMA PALLIDUM: CPT

## 2022-06-20 PROCEDURE — 85730 THROMBOPLASTIN TIME PARTIAL: CPT

## 2022-06-20 PROCEDURE — 85384 FIBRINOGEN ACTIVITY: CPT

## 2022-06-20 PROCEDURE — 84550 ASSAY OF BLOOD/URIC ACID: CPT

## 2022-06-20 PROCEDURE — 83615 LACTATE (LD) (LDH) ENZYME: CPT

## 2022-06-20 PROCEDURE — 81001 URINALYSIS AUTO W/SCOPE: CPT

## 2022-06-20 PROCEDURE — 86850 RBC ANTIBODY SCREEN: CPT

## 2022-06-20 PROCEDURE — 82570 ASSAY OF URINE CREATININE: CPT

## 2022-06-20 PROCEDURE — 59025 FETAL NON-STRESS TEST: CPT

## 2022-06-20 PROCEDURE — 86870 RBC ANTIBODY IDENTIFICATION: CPT

## 2022-06-20 PROCEDURE — 59050 FETAL MONITOR W/REPORT: CPT

## 2022-06-20 PROCEDURE — 36415 COLL VENOUS BLD VENIPUNCTURE: CPT

## 2022-06-20 PROCEDURE — 85025 COMPLETE CBC W/AUTO DIFF WBC: CPT

## 2022-06-20 RX ORDER — ACETAMINOPHEN 500 MG
3 TABLET ORAL
Qty: 30 | Refills: 0
Start: 2022-06-20

## 2022-06-20 RX ORDER — ACETAMINOPHEN 500 MG
3 TABLET ORAL
Qty: 0 | Refills: 0 | DISCHARGE
Start: 2022-06-20

## 2022-06-20 RX ORDER — IBUPROFEN 200 MG
1 TABLET ORAL
Qty: 0 | Refills: 0 | DISCHARGE
Start: 2022-06-20

## 2022-06-20 RX ADMIN — Medication 1 TABLET(S): at 08:23

## 2022-06-20 RX ADMIN — OXYCODONE HYDROCHLORIDE 5 MILLIGRAM(S): 5 TABLET ORAL at 12:48

## 2022-06-20 RX ADMIN — Medication 600 MILLIGRAM(S): at 01:00

## 2022-06-20 RX ADMIN — Medication 600 MILLIGRAM(S): at 06:44

## 2022-06-20 RX ADMIN — Medication 600 MILLIGRAM(S): at 06:19

## 2022-06-20 RX ADMIN — Medication 600 MILLIGRAM(S): at 00:37

## 2022-06-20 RX ADMIN — OXYCODONE HYDROCHLORIDE 5 MILLIGRAM(S): 5 TABLET ORAL at 13:18

## 2022-06-20 RX ADMIN — Medication 975 MILLIGRAM(S): at 09:00

## 2022-06-20 RX ADMIN — Medication 975 MILLIGRAM(S): at 08:22

## 2022-06-20 NOTE — PROGRESS NOTE ADULT - ASSESSMENT
A/P: 36yo PPD#1 s/p .  Patient is stable and doing well post-partum.   - Pain well controlled, continue current pain regimen  - Increase ambulation, SCDs when not ambulating  - Continue regular diet    Alicia Meraz, PGY-4

## 2022-06-20 NOTE — PROGRESS NOTE ADULT - ATTENDING COMMENTS
Patient seen and evaluated. PPD1 s/p , uncomplicated, doing well. voiding, ambulating, tolerating po, pain well controlled.     VSS  abd: soft, NT, ND, fundus firm  ext: symmetrical, non tender    A/P: PPD1 s/p   - meeting postop milestones  - girl  - s/p tdap/VI/MMRI  - rh neg will obtain rhogam if necessary prior to discharge today

## 2022-06-20 NOTE — PROGRESS NOTE ADULT - SUBJECTIVE AND OBJECTIVE BOX
OB Progress Note:  PPD#1    S: 36yo  PPD#1 s/p . Patient feels well. Pain is well controlled, tolerating regular diet, passing flatus, voiding spontaneously, ambulating without difficulty. Denies heavy vaginal bleeding, CP/SOB, N/V, lightheadedness/dizziness.     O:  Vitals:  Vital Signs Last 24 Hrs  T(C): 36.5 (2022 21:32), Max: 37.0 (2022 07:26)  T(F): 97.7 (2022 21:32), Max: 98.6 (2022 07:26)  HR: 63 (:32) (54 - 78)  BP: 116/72 (2022 21:32) (95/50 - 134/82)  BP(mean): --  RR: 18 (2022 21:32) (18 - 18)  SpO2: 98% (:32) (88% - 99%)    MEDICATIONS  (STANDING):  acetaminophen     Tablet .. 975 milliGRAM(s) Oral <User Schedule>  diphtheria/tetanus/pertussis (acellular) Vaccine (ADAcel) 0.5 milliLiter(s) IntraMuscular once  ibuprofen  Tablet. 600 milliGRAM(s) Oral every 6 hours  oxytocin Infusion 333.333 milliUNIT(s)/Min (1000 mL/Hr) IV Continuous <Continuous>  prenatal multivitamin 1 Tablet(s) Oral daily  sodium chloride 0.9% lock flush 3 milliLiter(s) IV Push every 8 hours      Labs:  Blood type: A Negative  Rubella IgG: RPR: Negative                          13.2   10.62<H> >-----------< 195    (  @ 23:06 )             39.5    22 @ 23:06      137  |  104  |  9   ----------------------------<  87  3.7   |  17<L>  |  0.66        Ca    9.3      2022 23:06    TPro  6.6  /  Alb  3.7  /  TBili  0.4  /  DBili  x   /  AST  23  /  ALT  14  /  AlkPhos  150<H>  22 @ 23:06          Physical Exam:  General: NAD  Abdomen: soft, non-tender, non-distended, fundus firm  Vaginal: No heavy vaginal bleeding  Extremities: No erythema/edema

## 2022-08-08 ENCOUNTER — RESULT REVIEW (OUTPATIENT)
Age: 37
End: 2022-08-08

## 2024-04-30 NOTE — OB RN PATIENT PROFILE - BREASTFEEDING MOTHER TAUGHT HOW TO HAND EXPRESS THEIR OWN MILK
Requesting medication refill. Please approve or deny this request.    Rx requested:  Requested Prescriptions     Pending Prescriptions Disp Refills    rivaroxaban (XARELTO) 15 MG TABS tablet 14 tablet 0     Sig: Take 1 tablet by mouth daily (with breakfast)         Last Office Visit:   9/21/2023      Next Visit Date:  Future Appointments   Date Time Provider Department Center   9/19/2024 12:45 PM Holiday, DO [facility]   11/6/2024 10:00 AM Flavia Goff PA LOR [facility]               
Statement Selected